# Patient Record
Sex: MALE | Race: WHITE
[De-identification: names, ages, dates, MRNs, and addresses within clinical notes are randomized per-mention and may not be internally consistent; named-entity substitution may affect disease eponyms.]

---

## 2019-12-03 ENCOUNTER — HOSPITAL ENCOUNTER (OUTPATIENT)
Dept: HOSPITAL 46 - OPS | Age: 44
Discharge: HOME | End: 2019-12-03
Attending: SURGERY
Payer: COMMERCIAL

## 2019-12-03 VITALS — BODY MASS INDEX: 28.85 KG/M2 | HEIGHT: 72 IN | WEIGHT: 213.01 LBS

## 2019-12-03 DIAGNOSIS — E03.9: ICD-10-CM

## 2019-12-03 DIAGNOSIS — K62.1: ICD-10-CM

## 2019-12-03 DIAGNOSIS — K63.5: ICD-10-CM

## 2019-12-03 DIAGNOSIS — D12.6: Primary | ICD-10-CM

## 2019-12-03 DIAGNOSIS — K59.00: ICD-10-CM

## 2019-12-03 DIAGNOSIS — J45.909: ICD-10-CM

## 2019-12-03 DIAGNOSIS — Z79.899: ICD-10-CM

## 2019-12-03 PROCEDURE — 0DBP8ZZ EXCISION OF RECTUM, VIA NATURAL OR ARTIFICIAL OPENING ENDOSCOPIC: ICD-10-PCS | Performed by: SURGERY

## 2019-12-03 PROCEDURE — 0DBL8ZZ EXCISION OF TRANSVERSE COLON, VIA NATURAL OR ARTIFICIAL OPENING ENDOSCOPIC: ICD-10-PCS | Performed by: SURGERY

## 2019-12-03 PROCEDURE — G0500 MOD SEDAT ENDO SERVICE >5YRS: HCPCS

## 2019-12-03 PROCEDURE — 0DBE8ZZ EXCISION OF LARGE INTESTINE, VIA NATURAL OR ARTIFICIAL OPENING ENDOSCOPIC: ICD-10-PCS | Performed by: SURGERY

## 2019-12-03 NOTE — NUR
12/03/19 1438 Kim Mena
1433-PATIENT ARRIVED TO PACU ON 2L NC REACTIVE TO VERBAL STIMULI
OPENING EYES. LAYING LEFT LATERAL. ABDOMEN SOFT ENCOURAGED TO PASS
GAS. PATIENT REPORTS "LITTLE CRAMPING" CLOSES EYES. RR EVEN.

## 2019-12-03 NOTE — OR
[facility]
                                    [address], [place]  [address]
_________________________________________________________________________________________
                                                                 Signed   
 
 
DATE OF OPERATION:
12/03/2019
 
SURGEON:
Cynthia Cedillo MD
 
PREOPERATIVE DIAGNOSIS:
History of left lower abdominal pain, largely resolved.  No family history of colon
cancer. 
 
POSTOPERATIVE DIAGNOSIS:
Polyps x4.
 
PROCEDURE PERFORMED:
Total colonoscopy to cecum with cold snare polypectomy x2 and cold morcellation
polypectomy x2. 
 
ANESTHESIA:
Intravenous sedation, fentanyl 150 mcg, Versed 9 mg.
 
INDICATION:
This 44-year-old white man is a patient of BOB Hanks.  He had an episode a
number of weeks ago of persistent left lower abdominal pain, but no associated rectal
bleeding.  These symptoms resolved entirely.  He has no family history of colon cancer.
He complains of constipation, which he attributes to aspirin.  He is admitted to undergo
colonoscopy on the basis of his symptoms.  He understands the risks of bleeding,
infection, perforation, and findings.  The prep was excellent.  Complete colonoscopy was
undertaken in the cecum without question.  There were 4 small polyps in total, all
excised with cold morcellation technique.  Narrow band imaging confirmed at least three
to likely be adenomatous. 
 
DESCRIPTION OF PROCEDURE:
The patient was brought to the endoscopy suite, placed in lateral decubitus position,
and given intravenous sedation to the point of slurred speech and nystagmus.  Digital
rectal examination was normal. 
 
An Olympus video colonoscope was passed in the rectum and manipulated throughout the
colon ultimately intubating the cecum itself.  The ileocecal valve and appendiceal
orifice were normal.  The scope was withdrawn, and in the region of the distal ascending
colon was a small polyp with narrow band imaging that appeared to be adenomatous in
appearance.  This was excised with cold snare polypectomy technique and extracted with
morcellation device.  Further withdrawal of scope showed no other abnormality into the
 
    Electronically Signed By: CYNTHIA CEDILLO MD  12/03/19 1732
_________________________________________________________________________________________
PATIENT NAME:     SERGIO HALE                      
MEDICAL RECORD #: [id number]            OPERATIVE REPORT              
          ACCT #: [id number]  
DATE OF BIRTH:   [birth year]            REPORT #: [id number]      
PHYSICIAN:        CYNTHIA CEDILLO MD                 
PCP:              KAYLEIGH WALKER PA-C           
REPORT IS CONFIDENTIAL AND NOT TO BE RELEASED WITHOUT AUTHORIZATION
 
 
                                  [facility]
                                    [address], [place]  [address]
_________________________________________________________________________________________
                                                                 Signed   
 
 
sigmoid where there were 2 small polyps, one of them certainly adenomatous in appearance
based on narrow band imaging.  Both were excised, one with cold snare technique.  The
other cold morcellation technique.  Retroflexed view was then undertaken in the rectum
showing a small polyp in the low rectum, which was excised with cold morcellation
technique.  The scope was withdrawn.  The patient was taken to recovery room in good
condition. 
 
CONCLUDING DIAGNOSIS:
Polyps x4.
 
PLAN:
Recommend repeat colonoscopy in 5 years unless there is an adverse finding on the
pathology report. 
 
 
 
            ________________________________________
            MD TOMMIE Montano/MODL
Job #:  [id number]
DD:  12/03/2019 14:38:19
DT:  12/03/2019 15:11:29
 
cc:            Kayleigh Walker PA-C
 
 
Copies:  KAYLEIGH WALKER PA-C
~
 
 
 
 
 
 
 
 
 
 
 
 
 
    Electronically Signed By: CYNTHIA CEDILLO MD  12/03/19 1732
_________________________________________________________________________________________
PATIENT NAME:     SERGIO HALE                      
MEDICAL RECORD #: [id number]            OPERATIVE REPORT              
          ACCT #: [id number]  
DATE OF BIRTH:   [birth year]            REPORT #: [id number]      
PHYSICIAN:        CYNTHIA CEDILLO MD                 
PCP:              KAYLEIGH WALKER PA-C           
REPORT IS CONFIDENTIAL AND NOT TO BE RELEASED WITHOUT AUTHORIZATION